# Patient Record
Sex: FEMALE | Race: WHITE | NOT HISPANIC OR LATINO | Employment: FULL TIME | URBAN - METROPOLITAN AREA
[De-identification: names, ages, dates, MRNs, and addresses within clinical notes are randomized per-mention and may not be internally consistent; named-entity substitution may affect disease eponyms.]

---

## 2024-06-08 ENCOUNTER — APPOINTMENT (OUTPATIENT)
Dept: RADIOLOGY | Facility: MEDICAL CENTER | Age: 29
End: 2024-06-08
Attending: EMERGENCY MEDICINE
Payer: OTHER MISCELLANEOUS

## 2024-06-08 ENCOUNTER — HOSPITAL ENCOUNTER (EMERGENCY)
Facility: MEDICAL CENTER | Age: 29
End: 2024-06-08
Attending: EMERGENCY MEDICINE
Payer: OTHER MISCELLANEOUS

## 2024-06-08 VITALS
HEIGHT: 71 IN | TEMPERATURE: 98.1 F | HEART RATE: 55 BPM | RESPIRATION RATE: 16 BRPM | SYSTOLIC BLOOD PRESSURE: 98 MMHG | WEIGHT: 236.55 LBS | DIASTOLIC BLOOD PRESSURE: 55 MMHG | OXYGEN SATURATION: 96 % | BODY MASS INDEX: 33.12 KG/M2

## 2024-06-08 DIAGNOSIS — R11.2 NAUSEA AND VOMITING, UNSPECIFIED VOMITING TYPE: ICD-10-CM

## 2024-06-08 DIAGNOSIS — R10.9 ACUTE ABDOMINAL PAIN: ICD-10-CM

## 2024-06-08 DIAGNOSIS — R31.9 HEMATURIA, UNSPECIFIED TYPE: ICD-10-CM

## 2024-06-08 LAB
ALBUMIN SERPL BCP-MCNC: 4.1 G/DL (ref 3.2–4.9)
ALBUMIN/GLOB SERPL: 1.5 G/DL
ALP SERPL-CCNC: 95 U/L (ref 30–99)
ALT SERPL-CCNC: 15 U/L (ref 2–50)
ANION GAP SERPL CALC-SCNC: 13 MMOL/L (ref 7–16)
APPEARANCE UR: CLEAR
AST SERPL-CCNC: 16 U/L (ref 12–45)
BACTERIA #/AREA URNS HPF: ABNORMAL /HPF
BASOPHILS # BLD AUTO: 0.5 % (ref 0–1.8)
BASOPHILS # BLD: 0.04 K/UL (ref 0–0.12)
BILIRUB SERPL-MCNC: 0.6 MG/DL (ref 0.1–1.5)
BILIRUB UR QL STRIP.AUTO: NEGATIVE
BUN SERPL-MCNC: 10 MG/DL (ref 8–22)
CALCIUM ALBUM COR SERPL-MCNC: 8.9 MG/DL (ref 8.5–10.5)
CALCIUM SERPL-MCNC: 9 MG/DL (ref 8.4–10.2)
CHLORIDE SERPL-SCNC: 105 MMOL/L (ref 96–112)
CO2 SERPL-SCNC: 19 MMOL/L (ref 20–33)
COLOR UR: YELLOW
CREAT SERPL-MCNC: 0.77 MG/DL (ref 0.5–1.4)
EOSINOPHIL # BLD AUTO: 0.1 K/UL (ref 0–0.51)
EOSINOPHIL NFR BLD: 1.3 % (ref 0–6.9)
EPI CELLS #/AREA URNS HPF: ABNORMAL /HPF
ERYTHROCYTE [DISTWIDTH] IN BLOOD BY AUTOMATED COUNT: 39 FL (ref 35.9–50)
GFR SERPLBLD CREATININE-BSD FMLA CKD-EPI: 107 ML/MIN/1.73 M 2
GLOBULIN SER CALC-MCNC: 2.8 G/DL (ref 1.9–3.5)
GLUCOSE SERPL-MCNC: 90 MG/DL (ref 65–99)
GLUCOSE UR STRIP.AUTO-MCNC: NEGATIVE MG/DL
HCG SERPL QL: NEGATIVE
HCT VFR BLD AUTO: 42 % (ref 37–47)
HGB BLD-MCNC: 14 G/DL (ref 12–16)
IMM GRANULOCYTES # BLD AUTO: 0.05 K/UL (ref 0–0.11)
IMM GRANULOCYTES NFR BLD AUTO: 0.6 % (ref 0–0.9)
KETONES UR STRIP.AUTO-MCNC: NEGATIVE MG/DL
LEUKOCYTE ESTERASE UR QL STRIP.AUTO: NEGATIVE
LIPASE SERPL-CCNC: 30 U/L (ref 11–82)
LYMPHOCYTES # BLD AUTO: 1.91 K/UL (ref 1–4.8)
LYMPHOCYTES NFR BLD: 24.3 % (ref 22–41)
MCH RBC QN AUTO: 30.1 PG (ref 27–33)
MCHC RBC AUTO-ENTMCNC: 33.3 G/DL (ref 32.2–35.5)
MCV RBC AUTO: 90.3 FL (ref 81.4–97.8)
MICRO URNS: ABNORMAL
MONOCYTES # BLD AUTO: 0.8 K/UL (ref 0–0.85)
MONOCYTES NFR BLD AUTO: 10.2 % (ref 0–13.4)
MUCOUS THREADS #/AREA URNS HPF: ABNORMAL /HPF
NEUTROPHILS # BLD AUTO: 4.97 K/UL (ref 1.82–7.42)
NEUTROPHILS NFR BLD: 63.1 % (ref 44–72)
NITRITE UR QL STRIP.AUTO: NEGATIVE
NRBC # BLD AUTO: 0 K/UL
NRBC BLD-RTO: 0 /100 WBC (ref 0–0.2)
PH UR STRIP.AUTO: 6 [PH] (ref 5–8)
PLATELET # BLD AUTO: 253 K/UL (ref 164–446)
PMV BLD AUTO: 9.4 FL (ref 9–12.9)
POTASSIUM SERPL-SCNC: 3.9 MMOL/L (ref 3.6–5.5)
PROT SERPL-MCNC: 6.9 G/DL (ref 6–8.2)
PROT UR QL STRIP: NEGATIVE MG/DL
RBC # BLD AUTO: 4.65 M/UL (ref 4.2–5.4)
RBC # URNS HPF: ABNORMAL /HPF
RBC UR QL AUTO: ABNORMAL
SODIUM SERPL-SCNC: 137 MMOL/L (ref 135–145)
SP GR UR STRIP.AUTO: 1.01
WBC # BLD AUTO: 7.9 K/UL (ref 4.8–10.8)
WBC #/AREA URNS HPF: ABNORMAL /HPF

## 2024-06-08 PROCEDURE — 83690 ASSAY OF LIPASE: CPT

## 2024-06-08 PROCEDURE — 84703 CHORIONIC GONADOTROPIN ASSAY: CPT

## 2024-06-08 PROCEDURE — 36415 COLL VENOUS BLD VENIPUNCTURE: CPT

## 2024-06-08 PROCEDURE — 99284 EMERGENCY DEPT VISIT MOD MDM: CPT

## 2024-06-08 PROCEDURE — 80053 COMPREHEN METABOLIC PANEL: CPT

## 2024-06-08 PROCEDURE — A9270 NON-COVERED ITEM OR SERVICE: HCPCS | Performed by: EMERGENCY MEDICINE

## 2024-06-08 PROCEDURE — 700102 HCHG RX REV CODE 250 W/ 637 OVERRIDE(OP): Performed by: EMERGENCY MEDICINE

## 2024-06-08 PROCEDURE — 85025 COMPLETE CBC W/AUTO DIFF WBC: CPT

## 2024-06-08 PROCEDURE — 81001 URINALYSIS AUTO W/SCOPE: CPT

## 2024-06-08 PROCEDURE — 76705 ECHO EXAM OF ABDOMEN: CPT

## 2024-06-08 RX ORDER — OMEPRAZOLE 20 MG/1
20 CAPSULE, DELAYED RELEASE ORAL DAILY
Qty: 30 CAPSULE | Refills: 0 | Status: SHIPPED | OUTPATIENT
Start: 2024-06-08

## 2024-06-08 RX ORDER — CALCIUM CARBONATE 500 MG/1
1000 TABLET, CHEWABLE ORAL ONCE
Status: COMPLETED | OUTPATIENT
Start: 2024-06-08 | End: 2024-06-08

## 2024-06-08 RX ORDER — ACETAMINOPHEN 500 MG
1000 TABLET ORAL ONCE
Status: COMPLETED | OUTPATIENT
Start: 2024-06-08 | End: 2024-06-08

## 2024-06-08 RX ORDER — ONDANSETRON 4 MG/1
4 TABLET, ORALLY DISINTEGRATING ORAL EVERY 8 HOURS PRN
Qty: 8 TABLET | Refills: 0 | Status: SHIPPED | OUTPATIENT
Start: 2024-06-08 | End: 2024-06-15

## 2024-06-08 RX ADMIN — CALCIUM CARBONATE (ANTACID) CHEW TAB 500 MG 1000 MG: 500 CHEW TAB at 18:43

## 2024-06-08 RX ADMIN — ACETAMINOPHEN 1000 MG: 500 TABLET, FILM COATED ORAL at 18:43

## 2024-06-08 RX ADMIN — LIDOCAINE HYDROCHLORIDE 30 ML: 20 SOLUTION ORAL; TOPICAL at 17:52

## 2024-06-08 ASSESSMENT — PAIN DESCRIPTION - PAIN TYPE
TYPE: ACUTE PAIN
TYPE: ACUTE PAIN

## 2024-06-09 ENCOUNTER — HOSPITAL ENCOUNTER (EMERGENCY)
Facility: MEDICAL CENTER | Age: 29
End: 2024-06-09
Attending: EMERGENCY MEDICINE
Payer: OTHER MISCELLANEOUS

## 2024-06-09 ENCOUNTER — APPOINTMENT (OUTPATIENT)
Dept: RADIOLOGY | Facility: MEDICAL CENTER | Age: 29
End: 2024-06-09
Attending: EMERGENCY MEDICINE
Payer: OTHER MISCELLANEOUS

## 2024-06-09 VITALS
TEMPERATURE: 97.9 F | DIASTOLIC BLOOD PRESSURE: 88 MMHG | BODY MASS INDEX: 33.43 KG/M2 | SYSTOLIC BLOOD PRESSURE: 139 MMHG | OXYGEN SATURATION: 99 % | HEIGHT: 71 IN | HEART RATE: 68 BPM | RESPIRATION RATE: 16 BRPM | WEIGHT: 238.76 LBS

## 2024-06-09 DIAGNOSIS — R10.84 GENERALIZED ABDOMINAL PAIN: ICD-10-CM

## 2024-06-09 DIAGNOSIS — N83.202 LEFT OVARIAN CYST: ICD-10-CM

## 2024-06-09 LAB
ALBUMIN SERPL BCP-MCNC: 3.9 G/DL (ref 3.2–4.9)
ALBUMIN/GLOB SERPL: 1.3 G/DL
ALP SERPL-CCNC: 109 U/L (ref 30–99)
ALT SERPL-CCNC: 15 U/L (ref 2–50)
ANION GAP SERPL CALC-SCNC: 11 MMOL/L (ref 7–16)
APPEARANCE UR: CLEAR
AST SERPL-CCNC: 19 U/L (ref 12–45)
BASOPHILS # BLD AUTO: 0.5 % (ref 0–1.8)
BASOPHILS # BLD: 0.05 K/UL (ref 0–0.12)
BILIRUB SERPL-MCNC: 0.3 MG/DL (ref 0.1–1.5)
BILIRUB UR QL STRIP.AUTO: NEGATIVE
BUN SERPL-MCNC: 8 MG/DL (ref 8–22)
CALCIUM ALBUM COR SERPL-MCNC: 9.1 MG/DL (ref 8.5–10.5)
CALCIUM SERPL-MCNC: 9 MG/DL (ref 8.4–10.2)
CHLORIDE SERPL-SCNC: 107 MMOL/L (ref 96–112)
CO2 SERPL-SCNC: 19 MMOL/L (ref 20–33)
COLOR UR: YELLOW
CREAT SERPL-MCNC: 0.77 MG/DL (ref 0.5–1.4)
EKG IMPRESSION: NORMAL
EOSINOPHIL # BLD AUTO: 0.13 K/UL (ref 0–0.51)
EOSINOPHIL NFR BLD: 1.4 % (ref 0–6.9)
ERYTHROCYTE [DISTWIDTH] IN BLOOD BY AUTOMATED COUNT: 39.5 FL (ref 35.9–50)
GFR SERPLBLD CREATININE-BSD FMLA CKD-EPI: 107 ML/MIN/1.73 M 2
GLOBULIN SER CALC-MCNC: 3 G/DL (ref 1.9–3.5)
GLUCOSE SERPL-MCNC: 84 MG/DL (ref 65–99)
GLUCOSE UR STRIP.AUTO-MCNC: NEGATIVE MG/DL
HCG SERPL QL: NEGATIVE
HCT VFR BLD AUTO: 42.3 % (ref 37–47)
HGB BLD-MCNC: 14.2 G/DL (ref 12–16)
IMM GRANULOCYTES # BLD AUTO: 0.03 K/UL (ref 0–0.11)
IMM GRANULOCYTES NFR BLD AUTO: 0.3 % (ref 0–0.9)
KETONES UR STRIP.AUTO-MCNC: NEGATIVE MG/DL
LEUKOCYTE ESTERASE UR QL STRIP.AUTO: NEGATIVE
LIPASE SERPL-CCNC: 39 U/L (ref 11–82)
LYMPHOCYTES # BLD AUTO: 2.1 K/UL (ref 1–4.8)
LYMPHOCYTES NFR BLD: 22.6 % (ref 22–41)
MCH RBC QN AUTO: 30.2 PG (ref 27–33)
MCHC RBC AUTO-ENTMCNC: 33.6 G/DL (ref 32.2–35.5)
MCV RBC AUTO: 90 FL (ref 81.4–97.8)
MICRO URNS: NORMAL
MONOCYTES # BLD AUTO: 0.91 K/UL (ref 0–0.85)
MONOCYTES NFR BLD AUTO: 9.8 % (ref 0–13.4)
NEUTROPHILS # BLD AUTO: 6.06 K/UL (ref 1.82–7.42)
NEUTROPHILS NFR BLD: 65.4 % (ref 44–72)
NITRITE UR QL STRIP.AUTO: NEGATIVE
NRBC # BLD AUTO: 0.02 K/UL
NRBC BLD-RTO: 0.2 /100 WBC (ref 0–0.2)
NT-PROBNP SERPL IA-MCNC: <36 PG/ML (ref 0–125)
PH UR STRIP.AUTO: 6 [PH] (ref 5–8)
PLATELET # BLD AUTO: 271 K/UL (ref 164–446)
PMV BLD AUTO: 9.6 FL (ref 9–12.9)
POTASSIUM SERPL-SCNC: 4.4 MMOL/L (ref 3.6–5.5)
PROT SERPL-MCNC: 6.9 G/DL (ref 6–8.2)
PROT UR QL STRIP: NEGATIVE MG/DL
RBC # BLD AUTO: 4.7 M/UL (ref 4.2–5.4)
RBC UR QL AUTO: NEGATIVE
SODIUM SERPL-SCNC: 137 MMOL/L (ref 135–145)
SP GR UR STRIP.AUTO: <=1.005
WBC # BLD AUTO: 9.3 K/UL (ref 4.8–10.8)

## 2024-06-09 PROCEDURE — 74177 CT ABD & PELVIS W/CONTRAST: CPT

## 2024-06-09 PROCEDURE — 83880 ASSAY OF NATRIURETIC PEPTIDE: CPT

## 2024-06-09 PROCEDURE — 99285 EMERGENCY DEPT VISIT HI MDM: CPT

## 2024-06-09 PROCEDURE — 76830 TRANSVAGINAL US NON-OB: CPT

## 2024-06-09 PROCEDURE — 700105 HCHG RX REV CODE 258: Performed by: EMERGENCY MEDICINE

## 2024-06-09 PROCEDURE — 80053 COMPREHEN METABOLIC PANEL: CPT

## 2024-06-09 PROCEDURE — 85025 COMPLETE CBC W/AUTO DIFF WBC: CPT

## 2024-06-09 PROCEDURE — 700117 HCHG RX CONTRAST REV CODE 255: Performed by: EMERGENCY MEDICINE

## 2024-06-09 PROCEDURE — 81003 URINALYSIS AUTO W/O SCOPE: CPT

## 2024-06-09 PROCEDURE — 71045 X-RAY EXAM CHEST 1 VIEW: CPT

## 2024-06-09 PROCEDURE — 93005 ELECTROCARDIOGRAM TRACING: CPT | Performed by: EMERGENCY MEDICINE

## 2024-06-09 PROCEDURE — 96374 THER/PROPH/DIAG INJ IV PUSH: CPT

## 2024-06-09 PROCEDURE — 71275 CT ANGIOGRAPHY CHEST: CPT

## 2024-06-09 PROCEDURE — 36415 COLL VENOUS BLD VENIPUNCTURE: CPT

## 2024-06-09 PROCEDURE — 84703 CHORIONIC GONADOTROPIN ASSAY: CPT

## 2024-06-09 PROCEDURE — 700111 HCHG RX REV CODE 636 W/ 250 OVERRIDE (IP): Mod: JZ | Performed by: EMERGENCY MEDICINE

## 2024-06-09 PROCEDURE — 83690 ASSAY OF LIPASE: CPT

## 2024-06-09 RX ORDER — KETOROLAC TROMETHAMINE 15 MG/ML
15 INJECTION, SOLUTION INTRAMUSCULAR; INTRAVENOUS ONCE
Status: COMPLETED | OUTPATIENT
Start: 2024-06-09 | End: 2024-06-09

## 2024-06-09 RX ORDER — SODIUM CHLORIDE 9 MG/ML
1000 INJECTION, SOLUTION INTRAVENOUS ONCE
Status: COMPLETED | OUTPATIENT
Start: 2024-06-09 | End: 2024-06-09

## 2024-06-09 RX ADMIN — IOHEXOL 100 ML: 350 INJECTION, SOLUTION INTRAVENOUS at 19:54

## 2024-06-09 RX ADMIN — SODIUM CHLORIDE 1000 ML: 9 INJECTION, SOLUTION INTRAVENOUS at 19:47

## 2024-06-09 RX ADMIN — KETOROLAC TROMETHAMINE 15 MG: 15 INJECTION, SOLUTION INTRAMUSCULAR; INTRAVENOUS at 21:42

## 2024-06-09 ASSESSMENT — FIBROSIS 4 INDEX: FIB4 SCORE: 0.46

## 2024-06-09 NOTE — ED NOTES
Pt ambulated back to room with steady gait. Pt advised to change into gown and is hooked up to monitor at this time.

## 2024-06-09 NOTE — ED TRIAGE NOTES
"Chief Complaint   Patient presents with    Abdominal Pain     X3 days LUQ and epigastric abd pain. Pain has been intermittent, worse in the morning, \"6/10 stabbing, constricting\" +Nausea, last emesis 2 days ago, diarrhea/ soft stools x a few days. Denies fevers. No sick contacts.      BP (!) 132/90   Pulse 88   Temp 36.7 °C (98.1 °F) (Temporal)   Resp 18   Ht 1.803 m (5' 11\")   Wt 107 kg (236 lb 8.9 oz)   LMP 05/10/2024   SpO2 96%   BMI 32.99 kg/m²     "

## 2024-06-09 NOTE — Clinical Note
Anabelle Arias was seen and treated in our emergency department on 6/9/2024.  She may return to work on 06/10/2024.       If you have any questions or concerns, please don't hesitate to call.      Bassam Frey D.O.

## 2024-06-09 NOTE — DISCHARGE INSTRUCTIONS
Please discuss the following findings with your regular doctor:  US-RUQ   Final Result      Partially contracted gallbladder. No gallstones. No biliary ductal dilatation.        Labs Reviewed   COMP METABOLIC PANEL - Abnormal; Notable for the following components:       Result Value    Co2 19 (*)     All other components within normal limits   URINALYSIS,CULTURE IF INDICATED - Abnormal; Notable for the following components:    Occult Blood Trace (*)     All other components within normal limits   URINE MICROSCOPIC (W/UA) - Abnormal; Notable for the following components:    Bacteria Few (*)     Epithelial Cells Moderate (*)     All other components within normal limits   CBC WITH DIFFERENTIAL   LIPASE   HCG QUAL SERUM   ESTIMATED GFR   HCG QUAL SERUM

## 2024-06-09 NOTE — ED NOTES
Pt provided with DC paper work and follow up care. No questions about new medications. Pt to ambulate out of ER once dressed.

## 2024-06-09 NOTE — ED NOTES
PIV established. Blood work sent to lab. Pt medicated per MAR with no questions. Call light in reach.

## 2024-06-09 NOTE — ED PROVIDER NOTES
"ER Provider Note    Scribed for Eros Malik M.d. by Karen Henderson. 6/8/2024  5:54 PM    Primary Care Provider: Pcp Pt States None    CHIEF COMPLAINT   Chief Complaint   Patient presents with    Abdominal Pain     X3 days LUQ and epigastric abd pain. Pain has been intermittent, worse in the morning, \"6/10 stabbing, constricting\" +Nausea, last emesis 2 days ago, diarrhea/ soft stools x a few days. Denies fevers. No sick contacts.      HPI/ROS  LIMITATION TO HISTORY   Select: : None  OUTSIDE HISTORIAN(S):  None    Anabelle Sepulveda is a 29 y.o. female who presents to the ED complaining of abdominal pain onset three days ago, however states the pain has subsided as of currently. She explains that the pain was localized in between her rib cage, and reports feeling like \"someone was grabbing\" the inside of her abdomen. She also reports feeling constriction and squeezing sensations in the abdomen as well. The patient states for the last few months she has noticed that she is unable to eat anything without experiencing a stomach ache, however the pain over the last few days is entirely new onset, causing her to present to the ED today. She states she had a few episodes of emesis 3 days ago, however none for the last two days. She reports taking 4 tablets of 200 mg Ibuprofen 3 days ago for headache. The patient states she had an emergency tracheotomy in 2009 for bacterial pneumonia, as her airway needed emergent access. The patient denies any new rashes, dysuria, or hematemesis. She states she does not smoke or consume THC. She denies alcohol consumption. The patient denies any known trauma, or any history of abdominal surgeries. The patient reports her last menstrual period to have been the 9th of last month, and states she has noticed that she has not had any vaginal discharge as of lately.     PAST MEDICAL HISTORY  No past medical history noted.     SURGICAL HISTORY  Past Surgical History:   Procedure Laterality " "Date    TRACHEOSTOMY      hx of trach 2009- removed same year but pt reports hx now of tracheal narrowing.       FAMILY HISTORY  No family history noted.     SOCIAL HISTORY   reports that she has never smoked. She has never used smokeless tobacco. She reports current alcohol use. She reports that she does not use drugs.    CURRENT MEDICATIONS  Previous Medications    LEVONORGESTREL-ETHINYL ESTRADIOL (KURVELO) 0.15-30 MG-MCG PER TABLET    Take 1 Tab by mouth every day.       ALLERGIES  Latex    PHYSICAL EXAM  /63   Pulse 69   Temp 36.7 °C (98.1 °F) (Temporal)   Resp 18   Ht 1.803 m (5' 11\")   Wt 107 kg (236 lb 8.9 oz)   LMP 05/10/2024   SpO2 95%   BMI 32.99 kg/m²   Constitutional: Alert in no apparent distress.  HENT: No signs of trauma, Bilateral external ears normal, Nose normal. Uvula midline.   Eyes: Pupils are equal and reactive, Conjunctiva normal, Non-icteric.   Neck: Normal range of motion, No tenderness, Supple, No stridor.   Lymphatic: No lymphadenopathy noted.   Cardiovascular: Regular rate and rhythm, no murmurs.   Thorax & Lungs: Normal breath sounds, No respiratory distress, No wheezing, No chest tenderness.   Abdomen: Right upper quadrant epigastric tenderness to palpation. Bowel sounds normal, Soft, No peritoneal signs, No masses, No pulsatile masses.   Skin: Warm, Dry, No erythema, No rash.   Back: No bony tenderness, No CVA tenderness.   Extremities: Intact distal pulses, No edema, No tenderness, No cyanosis.  Musculoskeletal: Good range of motion in all major joints. No tenderness to palpation or major deformities noted.   Neurologic: Alert , Normal motor function, Normal sensory function, No focal deficits noted.   Psychiatric: Affect normal, Judgment normal, Mood normal.       DIAGNOSTIC STUDIES    EKG/LABS  Labs Reviewed   COMP METABOLIC PANEL - Abnormal; Notable for the following components:       Result Value    Co2 19 (*)     All other components within normal limits "   URINALYSIS,CULTURE IF INDICATED - Abnormal; Notable for the following components:    Occult Blood Trace (*)     All other components within normal limits   URINE MICROSCOPIC (W/UA) - Abnormal; Notable for the following components:    Bacteria Few (*)     Epithelial Cells Moderate (*)     All other components within normal limits   CBC WITH DIFFERENTIAL   LIPASE   HCG QUAL SERUM   ESTIMATED GFR   HCG QUAL SERUM     RADIOLOGY/PROCEDURES   The attending emergency physician has independently interpreted the diagnostic imaging associated with this visit and am waiting the final reading from the radiologist.   My preliminary interpretation is a follows: no     Radiologist interpretation:  US-RUQ   Final Result      Partially contracted gallbladder. No gallstones. No biliary ductal dilatation.          COURSE & MEDICAL DECISION MAKING     ASSESSMENT, COURSE AND PLAN  Care Narrative:       6:25 PM - Patient seen and examined at bedside. Discussed plan of care, including diagnostic workup. Patient agrees to the plan of care. The patient will be  medicated with Tylenol, Tums, and GI Cocktail. Ordered for US RUQ, HCG qual serum, CBC w/ differential, CMP, Lipase, UA to evaluate her symptoms. Discussed referral to PCP and GI specialist.     8:40 PM- Patient was reevaluated at bedside. Discussed lab and radiology results with the patient and informed them of plans of care moving forward as there are no concerns regarding her gallbladder at this time. Patient had the opportunity to ask any questions. The plan for discharge was discussed with them and they were told to return for any new or worsening symptoms. She was also informed of the plans for follow up. Patient is understanding and agreeable to the plan for discharge.      #Abdominal pain  Patient with diffuse upper abdominal tenderness along with right upper quadrant and epigastric tenderness  Given patient's right upper quadrant tenderness ultrasound performed with no  evidence of cholecystitis or choledocholithiasis  No elevation in lipase  Patient not pregnant  Patient denies any dysuria however I did note to her that she does have some bacteria and trace blood in her urine and she needs to follow-up with primary care regarding this    Patient referred to primary care and GI    CT scan not performed today due to no focal right lower quadrant or left lower quadrant tenderness  However if patient experiences worsening pain she agrees to return to the emergency department for repeat abdominal exam and consideration for CT scan        DISPOSITION AND DISCUSSIONS  I have discussed management of the patient with the following physicians and CHRYSTAL's:  None    Discussion of management with other QHP or appropriate source(s): None     Escalation of care considered, and ultimately not performed: acute inpatient care management, however at this time, the patient is most appropriate for outpatient management.    Barriers to care at this time, including but not limited to: Patient does not have established PCP.     The patient will return for new or worsening symptoms and is stable at the time of discharge.    DISPOSITION:  Patient will be discharged home in stable condition.    FOLLOW UP:  [facility]  [address]  [facility] [address]  [phone]  In 3 days      [facility]  [address] [facility] [address]  [phone]  In 3 days      [facility]  [address] [address] [facility] [address]  [phone]  In 3 days        OUTPATIENT MEDICATIONS:  New Prescriptions    OMEPRAZOLE (PRILOSEC) 20 MG DELAYED-RELEASE CAPSULE    Take 1 Capsule by mouth every day.    ONDANSETRON (ZOFRAN ODT) 4 MG TABLET DISPERSIBLE    Take 1 Tablet by mouth every 8 hours as needed for Nausea/Vomiting for up to 7 days.       FINAL DIANGOSIS  1. Acute abdominal pain    2. Nausea and vomiting, unspecified vomiting type    3. Hematuria, unspecified type          The note  accurately reflects work and decisions made by me.  Eros Malik M.D.  6/8/2024  11:35 PM

## 2024-06-10 ENCOUNTER — HOSPITAL ENCOUNTER (EMERGENCY)
Facility: MEDICAL CENTER | Age: 29
End: 2024-06-10
Attending: STUDENT IN AN ORGANIZED HEALTH CARE EDUCATION/TRAINING PROGRAM
Payer: OTHER MISCELLANEOUS

## 2024-06-10 VITALS
OXYGEN SATURATION: 98 % | WEIGHT: 238.98 LBS | HEIGHT: 71 IN | SYSTOLIC BLOOD PRESSURE: 118 MMHG | TEMPERATURE: 97.5 F | DIASTOLIC BLOOD PRESSURE: 70 MMHG | BODY MASS INDEX: 33.46 KG/M2 | HEART RATE: 78 BPM | RESPIRATION RATE: 14 BRPM

## 2024-06-10 DIAGNOSIS — R19.7 DIARRHEA, UNSPECIFIED TYPE: ICD-10-CM

## 2024-06-10 DIAGNOSIS — R11.0 NAUSEA: ICD-10-CM

## 2024-06-10 DIAGNOSIS — R10.12 LEFT UPPER QUADRANT ABDOMINAL PAIN: ICD-10-CM

## 2024-06-10 LAB
ALBUMIN SERPL BCP-MCNC: 4 G/DL (ref 3.2–4.9)
ALBUMIN/GLOB SERPL: 1.5 G/DL
ALP SERPL-CCNC: 101 U/L (ref 30–99)
ALT SERPL-CCNC: 12 U/L (ref 2–50)
ANION GAP SERPL CALC-SCNC: 11 MMOL/L (ref 7–16)
AST SERPL-CCNC: 15 U/L (ref 12–45)
BASOPHILS # BLD AUTO: 0.5 % (ref 0–1.8)
BASOPHILS # BLD: 0.04 K/UL (ref 0–0.12)
BILIRUB SERPL-MCNC: 0.5 MG/DL (ref 0.1–1.5)
BUN SERPL-MCNC: 8 MG/DL (ref 8–22)
CALCIUM ALBUM COR SERPL-MCNC: 8.7 MG/DL (ref 8.5–10.5)
CALCIUM SERPL-MCNC: 8.7 MG/DL (ref 8.4–10.2)
CHLORIDE SERPL-SCNC: 106 MMOL/L (ref 96–112)
CO2 SERPL-SCNC: 21 MMOL/L (ref 20–33)
CREAT SERPL-MCNC: 0.7 MG/DL (ref 0.5–1.4)
EOSINOPHIL # BLD AUTO: 0.09 K/UL (ref 0–0.51)
EOSINOPHIL NFR BLD: 1.1 % (ref 0–6.9)
ERYTHROCYTE [DISTWIDTH] IN BLOOD BY AUTOMATED COUNT: 39.2 FL (ref 35.9–50)
GFR SERPLBLD CREATININE-BSD FMLA CKD-EPI: 121 ML/MIN/1.73 M 2
GLOBULIN SER CALC-MCNC: 2.7 G/DL (ref 1.9–3.5)
GLUCOSE SERPL-MCNC: 91 MG/DL (ref 65–99)
HCG SERPL QL: NEGATIVE
HCT VFR BLD AUTO: 41.7 % (ref 37–47)
HGB BLD-MCNC: 13.9 G/DL (ref 12–16)
IMM GRANULOCYTES # BLD AUTO: 0.02 K/UL (ref 0–0.11)
IMM GRANULOCYTES NFR BLD AUTO: 0.2 % (ref 0–0.9)
LIPASE SERPL-CCNC: 34 U/L (ref 11–82)
LYMPHOCYTES # BLD AUTO: 1.61 K/UL (ref 1–4.8)
LYMPHOCYTES NFR BLD: 19.7 % (ref 22–41)
MCH RBC QN AUTO: 30.1 PG (ref 27–33)
MCHC RBC AUTO-ENTMCNC: 33.3 G/DL (ref 32.2–35.5)
MCV RBC AUTO: 90.3 FL (ref 81.4–97.8)
MONOCYTES # BLD AUTO: 0.8 K/UL (ref 0–0.85)
MONOCYTES NFR BLD AUTO: 9.8 % (ref 0–13.4)
NEUTROPHILS # BLD AUTO: 5.63 K/UL (ref 1.82–7.42)
NEUTROPHILS NFR BLD: 68.7 % (ref 44–72)
NRBC # BLD AUTO: 0 K/UL
NRBC BLD-RTO: 0 /100 WBC (ref 0–0.2)
PLATELET # BLD AUTO: 262 K/UL (ref 164–446)
PMV BLD AUTO: 9.5 FL (ref 9–12.9)
POTASSIUM SERPL-SCNC: 3.8 MMOL/L (ref 3.6–5.5)
PROT SERPL-MCNC: 6.7 G/DL (ref 6–8.2)
RBC # BLD AUTO: 4.62 M/UL (ref 4.2–5.4)
SODIUM SERPL-SCNC: 138 MMOL/L (ref 135–145)
WBC # BLD AUTO: 8.2 K/UL (ref 4.8–10.8)

## 2024-06-10 PROCEDURE — 700102 HCHG RX REV CODE 250 W/ 637 OVERRIDE(OP): Performed by: STUDENT IN AN ORGANIZED HEALTH CARE EDUCATION/TRAINING PROGRAM

## 2024-06-10 PROCEDURE — 80053 COMPREHEN METABOLIC PANEL: CPT

## 2024-06-10 PROCEDURE — 700111 HCHG RX REV CODE 636 W/ 250 OVERRIDE (IP): Performed by: STUDENT IN AN ORGANIZED HEALTH CARE EDUCATION/TRAINING PROGRAM

## 2024-06-10 PROCEDURE — 84703 CHORIONIC GONADOTROPIN ASSAY: CPT

## 2024-06-10 PROCEDURE — A9270 NON-COVERED ITEM OR SERVICE: HCPCS | Performed by: STUDENT IN AN ORGANIZED HEALTH CARE EDUCATION/TRAINING PROGRAM

## 2024-06-10 PROCEDURE — 83690 ASSAY OF LIPASE: CPT

## 2024-06-10 PROCEDURE — 85025 COMPLETE CBC W/AUTO DIFF WBC: CPT

## 2024-06-10 PROCEDURE — 99284 EMERGENCY DEPT VISIT MOD MDM: CPT

## 2024-06-10 RX ORDER — OXYCODONE AND ACETAMINOPHEN 5; 325 MG/1; MG/1
1 TABLET ORAL ONCE
Status: COMPLETED | OUTPATIENT
Start: 2024-06-10 | End: 2024-06-10

## 2024-06-10 RX ORDER — IBUPROFEN 600 MG/1
600 TABLET, FILM COATED ORAL ONCE
Status: COMPLETED | OUTPATIENT
Start: 2024-06-10 | End: 2024-06-10

## 2024-06-10 RX ORDER — ONDANSETRON 4 MG/1
4 TABLET, ORALLY DISINTEGRATING ORAL ONCE
Status: COMPLETED | OUTPATIENT
Start: 2024-06-10 | End: 2024-06-10

## 2024-06-10 RX ORDER — ACETAMINOPHEN 325 MG/1
650 TABLET ORAL ONCE
Status: COMPLETED | OUTPATIENT
Start: 2024-06-10 | End: 2024-06-10

## 2024-06-10 RX ORDER — ONDANSETRON 4 MG/1
4 TABLET, ORALLY DISINTEGRATING ORAL EVERY 6 HOURS PRN
Qty: 10 TABLET | Refills: 0 | Status: SHIPPED | OUTPATIENT
Start: 2024-06-10

## 2024-06-10 RX ORDER — DICYCLOMINE HCL 20 MG
20 TABLET ORAL EVERY 6 HOURS PRN
Qty: 30 TABLET | Refills: 0 | Status: SHIPPED | OUTPATIENT
Start: 2024-06-10

## 2024-06-10 RX ADMIN — ONDANSETRON 4 MG: 4 TABLET, ORALLY DISINTEGRATING ORAL at 20:02

## 2024-06-10 RX ADMIN — IBUPROFEN 600 MG: 600 TABLET, FILM COATED ORAL at 20:02

## 2024-06-10 RX ADMIN — ACETAMINOPHEN 650 MG: 325 TABLET ORAL at 20:01

## 2024-06-10 RX ADMIN — OXYCODONE HYDROCHLORIDE AND ACETAMINOPHEN 1 TABLET: 5; 325 TABLET ORAL at 20:10

## 2024-06-10 ASSESSMENT — FIBROSIS 4 INDEX: FIB4 SCORE: 0.51

## 2024-06-10 NOTE — DISCHARGE SUMMARY
"  ED Observation Discharge Summary    Patient:Anabelle Sepulveda Main  Patient : 1995  Patient MRN: [id number]  Patient PCP: Pcp Pt States None    Admit Date: 2024  Discharge Date and Time: 24 10:13 PM  Discharge Diagnosis:   1. Generalized abdominal pain        2. Left ovarian cyst  Referral to Gynecology              Discharge Attending: Bassam Frey D.O.  Discharge Service: ED Observation    ED Course  Anabelle is a 29 y.o. female who was evaluated at [facility] with abdominal pain.  She was seen by Dr. Arroyo and ultrasound was pending to evaluate the possible cyst.    Ultrasound does show left cyst.  Signs not significant.  Her pain is improved.  Tach [facility] consult has been placed.  The patient is stable for discharge home.    Discharge Exam:  /88   Pulse 68   Temp 36.6 °C (97.9 °F) (Temporal)   Resp 16   Ht 1.803 m (5' 11\")   Wt 108 kg (238 lb 12.1 oz)   LMP 05/10/2024   SpO2 99%   BMI 33.30 kg/m² .    Constitutional: Awake and alert. Nontoxic  HENT:  Grossly normal  Eyes: Grossly normal  Neck: Normal range of motion  Cardiovascular: Normal heart rate   Thorax & Lungs: No respiratory distress  Abdomen: Nontender  Skin:  No pathologic rash.   Extremities: Well perfused  Psychiatric: Affect normal    Labs  Results for orders placed or performed during the hospital encounter of 24   CBC WITH DIFFERENTIAL   Result Value Ref Range    WBC 9.3 4.8 - 10.8 K/uL    RBC 4.70 4.20 - 5.40 M/uL    Hemoglobin 14.2 12.0 - 16.0 g/dL    Hematocrit 42.3 37.0 - 47.0 %    MCV 90.0 81.4 - 97.8 fL    MCH 30.2 27.0 - 33.0 pg    MCHC 33.6 32.2 - 35.5 g/dL    RDW 39.5 35.9 - 50.0 fL    Platelet Count 271 164 - 446 K/uL    MPV 9.6 9.0 - 12.9 fL    Neutrophils-Polys 65.40 44.00 - 72.00 %    Lymphocytes 22.60 22.00 - 41.00 %    Monocytes 9.80 0.00 - 13.40 %    Eosinophils 1.40 0.00 - 6.90 %    Basophils 0.50 0.00 - 1.80 %    Immature Granulocytes 0.30 0.00 - 0.90 %    Nucleated RBC 0.20 0.00 - 0.20 /100 " WBC    Neutrophils (Absolute) 6.06 1.82 - 7.42 K/uL    Lymphs (Absolute) 2.10 1.00 - 4.80 K/uL    Monos (Absolute) 0.91 (H) 0.00 - 0.85 K/uL    Eos (Absolute) 0.13 0.00 - 0.51 K/uL    Baso (Absolute) 0.05 0.00 - 0.12 K/uL    Immature Granulocytes (abs) 0.03 0.00 - 0.11 K/uL    NRBC (Absolute) 0.02 K/uL   COMP METABOLIC PANEL   Result Value Ref Range    Sodium 137 135 - 145 mmol/L    Potassium 4.4 3.6 - 5.5 mmol/L    Chloride 107 96 - 112 mmol/L    Co2 19 (L) 20 - 33 mmol/L    Anion Gap 11.0 7.0 - 16.0    Glucose 84 65 - 99 mg/dL    Bun 8 8 - 22 mg/dL    Creatinine 0.77 0.50 - 1.40 mg/dL    Calcium 9.0 8.4 - 10.2 mg/dL    Correct Calcium 9.1 8.5 - 10.5 mg/dL    AST(SGOT) 19 12 - 45 U/L    ALT(SGPT) 15 2 - 50 U/L    Alkaline Phosphatase 109 (H) 30 - 99 U/L    Total Bilirubin 0.3 0.1 - 1.5 mg/dL    Albumin 3.9 3.2 - 4.9 g/dL    Total Protein 6.9 6.0 - 8.2 g/dL    Globulin 3.0 1.9 - 3.5 g/dL    A-G Ratio 1.3 g/dL   LIPASE   Result Value Ref Range    Lipase 39 11 - 82 U/L   URINALYSIS,CULTURE IF INDICATED    Specimen: Urine, Clean Catch; Blood   Result Value Ref Range    Color Yellow     Character Clear     Specific Gravity <=1.005 <1.035    Ph 6.0 5.0 - 8.0    Glucose Negative Negative mg/dL    Ketones Negative Negative mg/dL    Protein Negative Negative mg/dL    Bilirubin Negative Negative    Nitrite Negative Negative    Leukocyte Esterase Negative Negative    Occult Blood Negative Negative    Micro Urine Req see below    HCG QUAL SERUM   Result Value Ref Range    Beta-Hcg Qualitative Serum Negative Negative   ESTIMATED GFR   Result Value Ref Range    GFR (CKD-EPI) 107 >60 mL/min/1.73 m 2   proBrain Natriuretic Peptide, NT   Result Value Ref Range    NT-proBNP <36 0 - 125 pg/mL   EKG   Result Value Ref Range    Report       [facility] Emergency Dept.    Test Date:  2024-06-09  Pt Name:    IVANNA SHAVER MAIN         Department: [facility]  MRN:        [id number]                      Room:       [facility]  7  Gender:     Female                       Technician: allegra  :        1995                   Requested By:ROYCE OZUNA  Order #:    [id number]                    Reading MD: ROYCE OZUNA MD    Measurements  Intervals                                Axis  Rate:       62                           P:          17  WA:         143                          QRS:        9  QRSD:       123                          T:          21  QT:         410  QTc:        417    Interpretive Statements  Sinus arrhythmia  Nonspecific intraventricular conduction delay  No previous ECG available for comparison  Electronically Signed On 2024 19:47:45 PDT by ROYCE OZUNA MD         Radiology  US-PELVIC COMPLETE (TRANSABDOMINAL/TRANSVAGINAL) (COMBO)   Final Result         1.  Septate uterine morphology   2.  Large left paraovarian cyst with peripheral mural nodules, indeterminate. Recommend six-week follow-up ultrasound for repeat characterization.      CT-CTA CHEST PULMONARY ARTERY W/ RECONS   Final Result         1. There is no evidence of pulmonary embolism although opacification of the pulmonary arteries is not optimal.   2. There is no focal area of consolidation, pleural effusion, or pneumothorax.            CT-ABDOMEN-PELVIS WITH   Final Result         1. There is no evidence for obstructive uropathy.   2. There is no evidence for bowel obstruction, diverticulitis, or appendicitis.   3. Probable left ovarian cyst.      DX-CHEST-PORTABLE (1 VIEW)   Final Result         1. No acute cardiopulmonary abnormalities are identified.          Medications:   New Prescriptions    No medications on file       My final assessment includes   1. Generalized abdominal pain        2. Left ovarian cyst  Referral to Gynecology          Upon Reevaluation, the patient's condition has: Improved; and will be discharged.    Patient discharged from ED Observation status at 10:20 PM (Time) 2024 (Date).     Total time spent on this  ED Observation discharge encounter is < 30 Minutes    Electronically signed by: Bassam Frey D.O., 6/9/2024 10:13 PM

## 2024-06-10 NOTE — ED PROVIDER NOTES
"ED Provider Note    CHIEF COMPLAINT  Chief Complaint   Patient presents with    Abdominal Pain     Patient report of abdominal pain consistent causing her to have difficulty breathing, speaks in full sentences.  (+) remains nauseated.  Patient was seen here yesterday for the same thing except for pain not improving and difficulty breathing.      Nausea    Difficulty Breathing       EXTERNAL RECORDS REVIEWED  Other ER note from 6/8/2024 reviewed.  Patient seen for 3 days of left upper quadrant and epigastric abdominal pain.  Associated nausea.  Patient had prior vomiting and diarrhea.  Right upper quadrant ultrasound showed partially contracted gallbladder.  No gallstones or dilated CBD.  Labs showed a normal white blood cell count.  No anemia.  Normal chemistry panel with the exception of CO2 of 19.  Normal lipase.  Negative hCG.    HPI/ROS  LIMITATION TO HISTORY   Select: : None    OUTSIDE HISTORIAN(S):  None    Anabelle Sepulveda is a 29 y.o. female who presents for evaluation of ongoing abdominal pain.    Patient states that she has had 4 days of intermittent pain in her abdomen.  She was seen yesterday for this pain but today it became constant which concerned her.  She has not improved.  She states she has had left lower quadrant pain that is stabbing and sharp.  In her right upper quadrant she has a \"grabbing\" pain.  Today, while getting ready for work, she had bilateral lower quadrant pain that was \" constricting and letting go\" that took her breath away.  She had vomiting 3 days ago and has had diarrhea for 1 week which she attributes to food intolerance.  She feels fatigued and admits to nausea when the pain comes on.    Patient denies fever, chills, vaginal discharge, history of ovarian cysts, urinary symptoms, blood in the stool, constipation, abdominal surgical history.    Patient is a G0, P0  LMP 5/9.  Patient is sexually active with her .  Using condoms only.  No OCPs.    PAST MEDICAL HISTORY   has " "a past medical history of Bacterial pneumonia.    SURGICAL HISTORY   has a past surgical history that includes tracheostomy and bronchoscopy.    FAMILY HISTORY  History reviewed. No pertinent family history.    SOCIAL HISTORY  Social History     Tobacco Use    Smoking status: Never    Smokeless tobacco: Never   Vaping Use    Vaping status: Never Used   Substance and Sexual Activity    Alcohol use: Yes     Comment: occ    Drug use: No    Sexual activity: Not on file       CURRENT MEDICATIONS  Home Medications       Reviewed by Loreta Stewart R.N. (Registered Nurse) on 06/09/24 at 1756  Med List Status: Not Addressed     Medication Last Dose Status   levonorgestrel-ethinyl estradiol (KURVELO) 0.15-30 MG-MCG per tablet not taking Active   omeprazole (PRILOSEC) 20 MG delayed-release capsule new rxn has not picked up the medication Active   ondansetron (ZOFRAN ODT) 4 MG TABLET DISPERSIBLE new rxn and has not picked up the medication yet Active                  Audit from Redirected Encounters    **Home medications have not yet been reviewed for this encounter**         ALLERGIES  Allergies   Allergen Reactions    Latex Hives       PHYSICAL EXAM  VITAL SIGNS: /88   Pulse 66   Temp 36.6 °C (97.9 °F) (Temporal)   Resp 19   Ht 1.803 m (5' 11\")   Wt 108 kg (238 lb 12.1 oz)   LMP 05/10/2024   SpO2 97%   BMI 33.30 kg/m²    General:  WD female with elevated BMI, nontoxic appearing in NAD; A+Ox3; V/S as above; afebrile  Skin: warm and dry; good color; no rash  HEENT: NCAT; EOMs intact; PERRL; no scleral icterus   Neck: FROM  Cardiovascular: Regular heart rate and rhythm.  No murmurs, rubs, or gallops; pulses 2+ bilaterally radially  Lungs: No respiratory distress or tachypnea; Clear to auscultation with good air movement bilaterally.  No wheezes, rhonchi, or rales.   Abdomen: soft; generalized tenderness; ND; no rebound, guarding, or rigidity.  No organomegaly or pulsatile mass; no CVAT   Extremities: MONIQUE x 4; no " e/o trauma; no pedal edema; neg Ezekiel's  Neurologic: CNs III-XII grossly intact; speech clear  Psychiatric: Appropriate affect, normal mood      EKG/LABS  Results for orders placed or performed during the hospital encounter of 06/09/24   CBC WITH DIFFERENTIAL   Result Value Ref Range    WBC 9.3 4.8 - 10.8 K/uL    RBC 4.70 4.20 - 5.40 M/uL    Hemoglobin 14.2 12.0 - 16.0 g/dL    Hematocrit 42.3 37.0 - 47.0 %    MCV 90.0 81.4 - 97.8 fL    MCH 30.2 27.0 - 33.0 pg    MCHC 33.6 32.2 - 35.5 g/dL    RDW 39.5 35.9 - 50.0 fL    Platelet Count 271 164 - 446 K/uL    MPV 9.6 9.0 - 12.9 fL    Neutrophils-Polys 65.40 44.00 - 72.00 %    Lymphocytes 22.60 22.00 - 41.00 %    Monocytes 9.80 0.00 - 13.40 %    Eosinophils 1.40 0.00 - 6.90 %    Basophils 0.50 0.00 - 1.80 %    Immature Granulocytes 0.30 0.00 - 0.90 %    Nucleated RBC 0.20 0.00 - 0.20 /100 WBC    Neutrophils (Absolute) 6.06 1.82 - 7.42 K/uL    Lymphs (Absolute) 2.10 1.00 - 4.80 K/uL    Monos (Absolute) 0.91 (H) 0.00 - 0.85 K/uL    Eos (Absolute) 0.13 0.00 - 0.51 K/uL    Baso (Absolute) 0.05 0.00 - 0.12 K/uL    Immature Granulocytes (abs) 0.03 0.00 - 0.11 K/uL    NRBC (Absolute) 0.02 K/uL   COMP METABOLIC PANEL   Result Value Ref Range    Sodium 137 135 - 145 mmol/L    Potassium 4.4 3.6 - 5.5 mmol/L    Chloride 107 96 - 112 mmol/L    Co2 19 (L) 20 - 33 mmol/L    Anion Gap 11.0 7.0 - 16.0    Glucose 84 65 - 99 mg/dL    Bun 8 8 - 22 mg/dL    Creatinine 0.77 0.50 - 1.40 mg/dL    Calcium 9.0 8.4 - 10.2 mg/dL    Correct Calcium 9.1 8.5 - 10.5 mg/dL    AST(SGOT) 19 12 - 45 U/L    ALT(SGPT) 15 2 - 50 U/L    Alkaline Phosphatase 109 (H) 30 - 99 U/L    Total Bilirubin 0.3 0.1 - 1.5 mg/dL    Albumin 3.9 3.2 - 4.9 g/dL    Total Protein 6.9 6.0 - 8.2 g/dL    Globulin 3.0 1.9 - 3.5 g/dL    A-G Ratio 1.3 g/dL   LIPASE   Result Value Ref Range    Lipase 39 11 - 82 U/L   URINALYSIS,CULTURE IF INDICATED    Specimen: Urine, Clean Catch; Blood   Result Value Ref Range    Color Yellow      Character Clear     Specific Gravity <=1.005 <1.035    Ph 6.0 5.0 - 8.0    Glucose Negative Negative mg/dL    Ketones Negative Negative mg/dL    Protein Negative Negative mg/dL    Bilirubin Negative Negative    Nitrite Negative Negative    Leukocyte Esterase Negative Negative    Occult Blood Negative Negative    Micro Urine Req see below    HCG QUAL SERUM   Result Value Ref Range    Beta-Hcg Qualitative Serum Negative Negative   ESTIMATED GFR   Result Value Ref Range    GFR (CKD-EPI) 107 >60 mL/min/1.73 m 2   proBrain Natriuretic Peptide, NT   Result Value Ref Range    NT-proBNP <36 0 - 125 pg/mL   EKG   Result Value Ref Range    Report       [facility]    Test Date:  2024  Pt Name:    IVANNA SHAVER MAIN         Department: [facility]  MRN:        [id number]                      [facility]  Gender:     Female                       Technician: allegra  :        [id number]                   Requested By:ROYCE OZUNA  Order #:    [id number]                    Reading MD: ROYCE OZUNA MD    Measurements  Intervals                                Axis  Rate:       62                           P:          17  VA:         143                          QRS:        9  QRSD:       123                          T:          21  QT:         410  QTc:        417    Interpretive Statements  Sinus arrhythmia  Nonspecific intraventricular conduction delay  No previous ECG available for comparison  Electronically Signed On 2024 19:47:45 PDT by ROYCE OZUNA MD         I have independently interpreted this EKG    RADIOLOGY/PROCEDURES   I have independently interpreted the diagnostic imaging associated with this visit and am waiting the final reading from the radiologist.   My preliminary interpretation is as follows:   No focal consolidation    Radiologist interpretation:  CT-CTA CHEST PULMONARY ARTERY W/ RECONS   Final Result         1. There is no evidence of  pulmonary embolism although opacification of the pulmonary arteries is not optimal.   2. There is no focal area of consolidation, pleural effusion, or pneumothorax.            CT-ABDOMEN-PELVIS WITH   Final Result         1. There is no evidence for obstructive uropathy.   2. There is no evidence for bowel obstruction, diverticulitis, or appendicitis.   3. Probable left ovarian cyst.      DX-CHEST-PORTABLE (1 VIEW)   Final Result         1. No acute cardiopulmonary abnormalities are identified.          COURSE & MEDICAL DECISION MAKING    ASSESSMENT, COURSE AND PLAN  Care Narrative: This is a 29-year-old female who was seen here yesterday for abdominal pain, now returning for more constant pain.  She also reports accompanying shortness of breath today.    Patient is afebrile and nontoxic-appearing.  Patient is not hypoxic or tachycardic.  She has a soft, minimal to moderately tender abdomen without focal tenderness.    Abdominal pain was worked up yesterday with an ultrasound and labs.  No obvious etiology detected.    Patient does not rule out for PE--medication list shows OCPs.    Chest x-ray ordered given complaints of shortness of breath.  This was negative for acute pathology such as pneumothorax, pleural effusion, pneumonia.    Labs today show a normal white blood cell count, CO2 of 19, alk phos 109, negative hCG, normal lipase.  BNP normal.  UA negative.    CT abd/pelvis and CTA PE study ordered.    Ambulatory pulse ox was 96%.    Normal saline bolus given CO2 of 19/mild dehydration.    EKG showed sinus arrhythmia.  No acute ST changes.    CT abdomen/pelvis shows probable left ovarian cyst measuring 3.8 cm.    CTA PE study shows no PE, focal consolidation, or pneumothorax.    Toradol 15 mg IV ordered.     Pelvic ultrasound ordered to further characterize the ovarian cyst seen on CT.    Patient advised of results and plan for pelvic ultrasound.  She is amenable to this plan.  She states that she is hungry.   She appears comfortable.  She will need a GYN referral which I will place in the system.      Dr. Frey will follow-up on pelvic ultrasound results.  If no torsion suspected/demonstrated, she may be discharged with plan for outpatient GYN follow-up and abdominal pain return precautions.        Hydration: Based on the patient's presentation of Dehydration the patient was given IV fluids. IV Hydration was used because oral hydration was not adequate alone. Upon recheck following hydration, the patient was stable.       FINAL DIAGNOSIS  1. Generalized abdominal pain    2. Left ovarian cyst        Electronically signed by: Perlita Arroyo M.D., 6/9/2024 6:32 PM

## 2024-06-10 NOTE — ED TRIAGE NOTES
"29 yr old female to triage  Chief Complaint   Patient presents with    Abdominal Pain     Patient report of abdominal pain consistent causing her to have difficulty breathing, speaks in full sentences.  (+) remains nauseated.  Patient was seen here yesterday for the same thing except for pain not improving and difficulty breathing.      Nausea    Difficulty Breathing     /88   Pulse 85   Temp 36.6 °C (97.9 °F) (Temporal)   Resp 16   Ht 1.803 m (5' 11\")   Wt 108 kg (238 lb 12.1 oz)   LMP 05/10/2024   SpO2 94%   BMI 33.30 kg/m²     "

## 2024-06-10 NOTE — DISCHARGE INSTRUCTIONS
THE EXACT CAUSE OF YOUR PAIN IS UNCLEAR--THIS MIGHT BE EARLY IN THE DISEASE PROCESS AND WE ARE UNABLE TO IDENTIFY IT AT THIS TIME (SUCH AS EARLY APPENDICITIS, FOR EXAMPLE).      RETURN TO ER IN 8-12 HRS UNLESS YOU ARE FEELING COMPLETELY BETTER.    RETURN SOONER FOR PAIN, VOMITING NOT CONTROLLED BY MEDICATIONS, FEVER, ANY OTHER CONCERN.    CLEAR LIQUIDS FOR NEXT 12 HOURS.  ADVANCE DIET AS TOLERATED.    You do have an ovarian cyst seen on CT today.  Pelvic ultrasound was also obtained in order to further characterize the cyst.  You will need follow-up with gynecology regarding this cyst.    Take Tylenol 650 mg every 4 hours as needed for pain and/or fever.    Take ibuprofen (also known as Advil or Motrin) 600 mg every 6 hours with food and water as needed for pain and/or fever.

## 2024-06-11 NOTE — ED NOTES
ERP at bedside. Pt agrees with plan of care discussed by ERP. Fe in low position, side rail up for pt safety. Call light within reach. Will continue to monitor.

## 2024-06-11 NOTE — ED PROVIDER NOTES
CHIEF COMPLAINT  Chief Complaint   Patient presents with    Abdominal Pain     Seen last 2 days for same. Reports pain continued and is generalized but greater on LUQ and LLQ abd. Reports also nausea and diarrhea. Denies noting blood in stools or known fevers.       LIMITATION TO HISTORY   Select: None    HPI    Anabelle Arias is a 29 y.o. female who presents to the Emergency Department presents for evaluation of intermittent episodes of left upper quadrant abdominal pain which is been ongoing for the past 5 days.  She has been seen in the emergency department twice over the past 2 days for this.  Has had a right upper quadrant ultrasound and pelvic ultrasound a chest CTA CT abdomen pelvis.  Pelvic ultrasound did show a ovarian cyst without evidence of torsion though her imaging otherwise at this point has been unremarkable.  Today she had a return of her abdominal pain described as a sharp spasm and station in her left upper quadrant with associated nausea no vomiting.  No diarrhea black tarry stools bloody stools mucousy stools.  OUTSIDE HISTORIAN(S):  Select: None    EXTERNAL RECORDS REVIEWED  Select: Other patient has a prior history of tracheotomy, and reviewed multiple imaging studies of over the past few days or event been no significant pathology noted      PAST MEDICAL HISTORY  Past Medical History:   Diagnosis Date    Bacterial pneumonia      .    SURGICAL HISTORY  Past Surgical History:   Procedure Laterality Date    BRONCHOSCOPY      TRACHEOSTOMY      hx of trach 2009- removed same year but pt reports hx now of tracheal narrowing.         FAMILY HISTORY  No family history on file.       SOCIAL HISTORY  Social History     Socioeconomic History    Marital status:      Spouse name: Not on file    Number of children: Not on file    Years of education: Not on file    Highest education level: Not on file   Occupational History    Not on file   Tobacco Use    Smoking status: Never    Smokeless  "tobacco: Never   Vaping Use    Vaping status: Never Used   Substance and Sexual Activity    Alcohol use: Yes     Comment: occ    Drug use: No    Sexual activity: Not on file   Other Topics Concern    Not on file   Social History Narrative    Not on file     Social Determinants of Health     Financial Resource Strain: Not on file   Food Insecurity: Not on file   Transportation Needs: Not on file   Physical Activity: Not on file   Stress: Not on file   Social Connections: Not on file   Intimate Partner Violence: Not on file   Housing Stability: Not on file         CURRENT MEDICATIONS  No current facility-administered medications on file prior to encounter.     Current Outpatient Medications on File Prior to Encounter   Medication Sig Dispense Refill    omeprazole (PRILOSEC) 20 MG delayed-release capsule Take 1 Capsule by mouth every day. 30 Capsule 0    ondansetron (ZOFRAN ODT) 4 MG TABLET DISPERSIBLE Take 1 Tablet by mouth every 8 hours as needed for Nausea/Vomiting for up to 7 days. 8 Tablet 0    levonorgestrel-ethinyl estradiol (KURVELO) 0.15-30 MG-MCG per tablet Take 1 Tab by mouth every day.             ALLERGIES  Allergies   Allergen Reactions    Latex Hives       PHYSICAL EXAM  VITAL SIGNS:/77   Pulse 98   Temp 36.4 °C (97.5 °F) (Temporal)   Resp 20   Ht 1.803 m (5' 11\")   Wt 108 kg (238 lb 15.7 oz)   LMP 05/10/2024   SpO2 96%   BMI 33.33 kg/m²       VITALS - vital signs documented prior to this note have been reviewed and noted,  GENERAL - awake, alert, oriented, GCS 15, no apparent distress, non-toxic  appearing  HEENT - normocephalic, atraumatic, pupils equal, sclera anicteric, mucus  membranes moist  NECK - supple, no meningismus, full active range of motion, trachea midline  CARDIOVASCULAR - regular rate/rhythm, no murmurs/gallops/rubs  PULMONARY - no respiratory distress, speaking in full sentences, clear to  auscultation bilaterally, no wheezing/ronchi/rales, no accessory muscle " use  GASTROINTESTINAL -mild left upper quadrant tenderness otherwise soft, non-tender, non-distended, no rebound, guarding,  or peritonitis  GENITOURINARY - Deferred  NEUROLOGIC - Awake alert, normal mental status, speech fluid, cognition  normal, moves all extremities  MUSCULOSKELETAL - no obvious asymmetry or deformities present  EXTREMITIES - warm, well-perfused, no cyanosis or significant edema  DERMATOLOGIC - warm, dry, no rashes, no jaundice  PSYCHIATRIC - normal affect, normal insight, normal concentration    DIAGNOSTIC STUDIES / PROCEDURES    LABS  Labs Reviewed   CBC WITH DIFFERENTIAL - Abnormal; Notable for the following components:       Result Value    Lymphocytes 19.70 (*)     All other components within normal limits   COMP METABOLIC PANEL - Abnormal; Notable for the following components:    Alkaline Phosphatase 101 (*)     All other components within normal limits   LIPASE   HCG QUAL SERUM   ESTIMATED GFR       Labs showed no abnormalities of clinical significance        Radiologist interpretation:   No orders to display        COURSE & MEDICAL DECISION MAKING    ED COURSE:        INTERVENTIONS BY ME:  Medications   acetaminophen (Tylenol) tablet 650 mg (650 mg Oral Given 6/10/24 2001)   ibuprofen (Motrin) tablet 600 mg (600 mg Oral Given 6/10/24 2002)   oxyCODONE-acetaminophen (Percocet) 5-325 MG per tablet 1 Tablet (1 Tablet Oral Given 6/10/24 2010)   ondansetron (Zofran ODT) dispertab 4 mg (4 mg Oral Given 6/10/24 2002)       Response on recheck: Repeat abdominal exam at 1830 is improved no rebound guarding or peritonitis    INITIAL ASSESSMENT, COURSE AND PLAN  Care Narrative: Patient presented for evaluation of intermittent episodes of left upper quadrant abdominal pain.  Been evaluated twice over the past 2 days for this had extensive imaging all of which have been negative for acute pathology.  Her labs were repeated today and did not demonstrate any significant abnormalities at this point I do  not see an indication to repeat imaging.  Will trial the patient on Zofran and Bentyl and have her return if her symptoms not improve return precautions were discussed she was discharged in stable condition             ADDITIONAL PROBLEM LIST    DISPOSITION AND DISCUSSIONS      Escalation of care considered, and ultimately not performed:diagnostic imaging    Barriers to care at this time, including but not limited to: Patient does not have established PCP.     Decision tools and prescription drugs considered including, but not limited to:  Bentyl Zofran .    FINAL DIAGNOSIS  1. Left upper quadrant abdominal pain    2. Nausea    3. Diarrhea, unspecified type             Electronically signed by: Lavelle Steele DO ,8:44 PM 06/10/24

## 2024-06-11 NOTE — ED TRIAGE NOTES
Chief Complaint   Patient presents with    Abdominal Pain     Seen last 2 days for same. Reports pain continued and is generalized but greater on LUQ and LLQ abd. Reports also nausea and diarrhea. Denies noting blood in stools or known fevers.     Physical Exam  Pulmonary:      Effort: Pulmonary effort is normal.   Skin:     General: Skin is warm and dry.   Neurological:      Mental Status: She is alert.

## 2024-06-13 ENCOUNTER — APPOINTMENT (OUTPATIENT)
Dept: INTERNAL MEDICINE | Facility: OTHER | Age: 29
End: 2024-06-13
Payer: OTHER MISCELLANEOUS

## 2024-06-14 ENCOUNTER — TELEPHONE (OUTPATIENT)
Dept: OBGYN | Facility: CLINIC | Age: 29
End: 2024-06-14
Payer: OTHER MISCELLANEOUS

## 2024-06-14 NOTE — TELEPHONE ENCOUNTER
I left this patient multiple voicemail's trying to reach her about  her appts and that they may be scheduled wrong. She is supposed to come in for a ER follow up within the next 2 weeks. I also reached out to her emergency contact with no answer.

## 2024-07-03 ENCOUNTER — APPOINTMENT (OUTPATIENT)
Dept: OBGYN | Facility: CLINIC | Age: 29
End: 2024-07-03
Payer: OTHER MISCELLANEOUS

## 2024-08-05 ENCOUNTER — APPOINTMENT (OUTPATIENT)
Dept: OBGYN | Facility: CLINIC | Age: 29
End: 2024-08-05
Payer: OTHER MISCELLANEOUS

## 2024-10-15 ENCOUNTER — HOSPITAL ENCOUNTER (OUTPATIENT)
Dept: RADIOLOGY | Facility: MEDICAL CENTER | Age: 29
End: 2024-10-15
Attending: PHYSICIAN ASSISTANT
Payer: COMMERCIAL

## 2024-10-15 ENCOUNTER — OFFICE VISIT (OUTPATIENT)
Dept: URGENT CARE | Facility: PHYSICIAN GROUP | Age: 29
End: 2024-10-15
Payer: COMMERCIAL

## 2024-10-15 VITALS
TEMPERATURE: 98.7 F | RESPIRATION RATE: 16 BRPM | OXYGEN SATURATION: 95 % | SYSTOLIC BLOOD PRESSURE: 100 MMHG | BODY MASS INDEX: 33.96 KG/M2 | HEART RATE: 75 BPM | DIASTOLIC BLOOD PRESSURE: 70 MMHG | HEIGHT: 70 IN | WEIGHT: 237.2 LBS

## 2024-10-15 DIAGNOSIS — S93.601A SPRAIN OF RIGHT FOOT, INITIAL ENCOUNTER: ICD-10-CM

## 2024-10-15 PROCEDURE — 73630 X-RAY EXAM OF FOOT: CPT | Mod: RT

## 2024-10-15 PROCEDURE — 3078F DIAST BP <80 MM HG: CPT | Performed by: PHYSICIAN ASSISTANT

## 2024-10-15 PROCEDURE — 3074F SYST BP LT 130 MM HG: CPT | Performed by: PHYSICIAN ASSISTANT

## 2024-10-15 PROCEDURE — 99214 OFFICE O/P EST MOD 30 MIN: CPT | Performed by: PHYSICIAN ASSISTANT

## 2024-10-15 ASSESSMENT — ENCOUNTER SYMPTOMS
HEADACHES: 0
ABDOMINAL PAIN: 0
MYALGIAS: 0
CONSTIPATION: 0
CHILLS: 0
FEVER: 0
VOMITING: 0
SORE THROAT: 0
SHORTNESS OF BREATH: 0
COUGH: 0
DIARRHEA: 0
NAUSEA: 0
EYE PAIN: 0

## 2024-10-15 ASSESSMENT — FIBROSIS 4 INDEX: FIB4 SCORE: 0.46

## 2025-01-06 ENCOUNTER — TELEPHONE (OUTPATIENT)
Dept: HEALTH INFORMATION MANAGEMENT | Facility: OTHER | Age: 30
End: 2025-01-06